# Patient Record
Sex: MALE | Race: WHITE | NOT HISPANIC OR LATINO
[De-identification: names, ages, dates, MRNs, and addresses within clinical notes are randomized per-mention and may not be internally consistent; named-entity substitution may affect disease eponyms.]

---

## 2022-09-28 ENCOUNTER — NON-APPOINTMENT (OUTPATIENT)
Age: 42
End: 2022-09-28

## 2022-09-29 ENCOUNTER — INPATIENT (INPATIENT)
Facility: HOSPITAL | Age: 42
LOS: 2 days | Discharge: HOME | DRG: 392 | End: 2022-10-02
Payer: COMMERCIAL

## 2022-09-29 VITALS
RESPIRATION RATE: 18 BRPM | TEMPERATURE: 99 F | OXYGEN SATURATION: 97 % | HEART RATE: 77 BPM | HEIGHT: 70 IN | DIASTOLIC BLOOD PRESSURE: 79 MMHG | SYSTOLIC BLOOD PRESSURE: 117 MMHG | WEIGHT: 156.09 LBS

## 2022-09-29 LAB
ALBUMIN SERPL ELPH-MCNC: 4.6 G/DL — SIGNIFICANT CHANGE UP (ref 3.3–5)
ALP SERPL-CCNC: 75 U/L — SIGNIFICANT CHANGE UP (ref 40–120)
ALT FLD-CCNC: 11 U/L — SIGNIFICANT CHANGE UP (ref 10–45)
ANION GAP SERPL CALC-SCNC: 10 MMOL/L — SIGNIFICANT CHANGE UP (ref 5–17)
AST SERPL-CCNC: 17 U/L — SIGNIFICANT CHANGE UP (ref 10–40)
BASOPHILS # BLD AUTO: 0.02 K/UL — SIGNIFICANT CHANGE UP (ref 0–0.2)
BASOPHILS NFR BLD AUTO: 0.2 % — SIGNIFICANT CHANGE UP (ref 0–2)
BILIRUB SERPL-MCNC: 2.5 MG/DL — HIGH (ref 0.2–1.2)
BUN SERPL-MCNC: 12 MG/DL — SIGNIFICANT CHANGE UP (ref 7–23)
CALCIUM SERPL-MCNC: 9.7 MG/DL — SIGNIFICANT CHANGE UP (ref 8.4–10.5)
CHLORIDE SERPL-SCNC: 101 MMOL/L — SIGNIFICANT CHANGE UP (ref 96–108)
CO2 SERPL-SCNC: 26 MMOL/L — SIGNIFICANT CHANGE UP (ref 22–31)
CREAT SERPL-MCNC: 0.94 MG/DL — SIGNIFICANT CHANGE UP (ref 0.5–1.3)
EGFR: 104 ML/MIN/1.73M2 — SIGNIFICANT CHANGE UP
EOSINOPHIL # BLD AUTO: 0.02 K/UL — SIGNIFICANT CHANGE UP (ref 0–0.5)
EOSINOPHIL NFR BLD AUTO: 0.2 % — SIGNIFICANT CHANGE UP (ref 0–6)
GLUCOSE SERPL-MCNC: 100 MG/DL — HIGH (ref 70–99)
HCT VFR BLD CALC: 43.8 % — SIGNIFICANT CHANGE UP (ref 39–50)
HGB BLD-MCNC: 15.4 G/DL — SIGNIFICANT CHANGE UP (ref 13–17)
IMM GRANULOCYTES NFR BLD AUTO: 0.2 % — SIGNIFICANT CHANGE UP (ref 0–0.9)
LIDOCAIN IGE QN: 19 U/L — SIGNIFICANT CHANGE UP (ref 7–60)
LYMPHOCYTES # BLD AUTO: 0.83 K/UL — LOW (ref 1–3.3)
LYMPHOCYTES # BLD AUTO: 8.5 % — LOW (ref 13–44)
MAGNESIUM SERPL-MCNC: 1.8 MG/DL — SIGNIFICANT CHANGE UP (ref 1.6–2.6)
MCHC RBC-ENTMCNC: 29.9 PG — SIGNIFICANT CHANGE UP (ref 27–34)
MCHC RBC-ENTMCNC: 35.2 GM/DL — SIGNIFICANT CHANGE UP (ref 32–36)
MCV RBC AUTO: 85 FL — SIGNIFICANT CHANGE UP (ref 80–100)
MONOCYTES # BLD AUTO: 0.62 K/UL — SIGNIFICANT CHANGE UP (ref 0–0.9)
MONOCYTES NFR BLD AUTO: 6.3 % — SIGNIFICANT CHANGE UP (ref 2–14)
NEUTROPHILS # BLD AUTO: 8.31 K/UL — HIGH (ref 1.8–7.4)
NEUTROPHILS NFR BLD AUTO: 84.6 % — HIGH (ref 43–77)
NRBC # BLD: 0 /100 WBCS — SIGNIFICANT CHANGE UP (ref 0–0)
PLATELET # BLD AUTO: 147 K/UL — LOW (ref 150–400)
POTASSIUM SERPL-MCNC: 4.1 MMOL/L — SIGNIFICANT CHANGE UP (ref 3.5–5.3)
POTASSIUM SERPL-SCNC: 4.1 MMOL/L — SIGNIFICANT CHANGE UP (ref 3.5–5.3)
PROCALCITONIN SERPL-MCNC: 0.16 NG/ML — HIGH (ref 0.02–0.1)
PROT SERPL-MCNC: 7.4 G/DL — SIGNIFICANT CHANGE UP (ref 6–8.3)
RBC # BLD: 5.15 M/UL — SIGNIFICANT CHANGE UP (ref 4.2–5.8)
RBC # FLD: 12.2 % — SIGNIFICANT CHANGE UP (ref 10.3–14.5)
SODIUM SERPL-SCNC: 137 MMOL/L — SIGNIFICANT CHANGE UP (ref 135–145)
WBC # BLD: 9.82 K/UL — SIGNIFICANT CHANGE UP (ref 3.8–10.5)
WBC # FLD AUTO: 9.82 K/UL — SIGNIFICANT CHANGE UP (ref 3.8–10.5)

## 2022-09-29 PROCEDURE — 74177 CT ABD & PELVIS W/CONTRAST: CPT | Mod: 26,MA

## 2022-09-29 PROCEDURE — 99285 EMERGENCY DEPT VISIT HI MDM: CPT

## 2022-09-29 RX ORDER — ONDANSETRON 8 MG/1
4 TABLET, FILM COATED ORAL ONCE
Refills: 0 | Status: COMPLETED | OUTPATIENT
Start: 2022-09-29 | End: 2022-09-29

## 2022-09-29 RX ORDER — AMPICILLIN SODIUM AND SULBACTAM SODIUM 250; 125 MG/ML; MG/ML
3 INJECTION, POWDER, FOR SUSPENSION INTRAMUSCULAR; INTRAVENOUS ONCE
Refills: 0 | Status: COMPLETED | OUTPATIENT
Start: 2022-09-29 | End: 2022-09-29

## 2022-09-29 RX ORDER — SODIUM CHLORIDE 9 MG/ML
1000 INJECTION, SOLUTION INTRAVENOUS ONCE
Refills: 0 | Status: COMPLETED | OUTPATIENT
Start: 2022-09-29 | End: 2022-09-29

## 2022-09-29 RX ORDER — ACETAMINOPHEN 500 MG
1000 TABLET ORAL ONCE
Refills: 0 | Status: COMPLETED | OUTPATIENT
Start: 2022-09-29 | End: 2022-09-29

## 2022-09-29 RX ORDER — FAMOTIDINE 10 MG/ML
20 INJECTION INTRAVENOUS ONCE
Refills: 0 | Status: COMPLETED | OUTPATIENT
Start: 2022-09-29 | End: 2022-09-29

## 2022-09-29 RX ORDER — SODIUM CHLORIDE 9 MG/ML
1000 INJECTION, SOLUTION INTRAVENOUS
Refills: 0 | Status: DISCONTINUED | OUTPATIENT
Start: 2022-09-29 | End: 2022-09-30

## 2022-09-29 RX ADMIN — FAMOTIDINE 20 MILLIGRAM(S): 10 INJECTION INTRAVENOUS at 22:10

## 2022-09-29 RX ADMIN — Medication 400 MILLIGRAM(S): at 22:11

## 2022-09-29 RX ADMIN — SODIUM CHLORIDE 4000 MILLILITER(S): 9 INJECTION, SOLUTION INTRAVENOUS at 22:11

## 2022-09-29 RX ADMIN — ONDANSETRON 4 MILLIGRAM(S): 8 TABLET, FILM COATED ORAL at 22:11

## 2022-09-29 NOTE — ED PROVIDER NOTE - PHYSICAL EXAMINATION
gen: Non toxic appearing, looks uncomfortable  Mentation: AAO x 3  psych: mood appropriate  HEENT: airway patent, conjunctivae clear bilaterally  Cardio: RRR, no m/r/g  Resp: normal BS b/l  GI: soft/nondistended, LLQ ttp  Neuro: sensation and motor function grossly intact  Skin: No evidence of rash  MSK: normal movement of all extremities  Lymph/Vasc: no LE edema

## 2022-09-29 NOTE — ED PROVIDER NOTE - ATTENDING CONTRIBUTION TO CARE
------------ATTENDING NOTE------------  pt c/o 24 hrs of constant gradually worsening moderate crampy pressure in LLQ, worse w/ bending/twisting, decreased PO, no fevers, no urinary complaints, no change w/ BM/stools, exam w/ LLQ tenderness/rebound, labs/imaging c/w acute diverticulitis, improving w/ IVF/pain medications, awaiting surgical consult and likely admission for IVF, advance diet, antibiotics, pain control.  - Mann Denney MD   ----------------------------------------------

## 2022-09-29 NOTE — ED ADULT NURSE NOTE - OBJECTIVE STATEMENT
41yo M no pertinent pmh presents to ED ambulatory from home with c/o constant burning LLQ abdominal pain since 3am, reports pain is worse with sudden movement. Pt reports was seen at  today and had negative urinalysis, advised to ED for further eval. Pt endorsing associated nausea and chills. Denies fevers, cp, sob, vomiting, diarrhea. In ED, pt is awake, a&ox4, appears uncomfortable, afebrile, vitals stable, abd soft nd ttp to LLQ. pt seen and eval by MD. Labs sent. Pt medicated. Pt sent to CT myles. pending results.

## 2022-09-29 NOTE — ED PROVIDER NOTE - RAPID ASSESSMENT
42 M with no pertinent PMHx presents to the Ed c/o L abd pain x today. Pt woke up this morning with abd discomfort and went back to sleep with no improvement in pain. Also endorses decreased PO intake. Went to UC and had a UA that was normal. Took GasX and Miralax w/o improvement.  Denies dysuria, hematuria, frequency, vomiting. Pt is well appearing.    **Pt seen in the waiting room via teletriage by Natalie Funez), documentation completed by Joni Sams. Pt to be sent to main ED for further evaluation - all orders placed to be followed by MD in the main ED** 42 M with no pertinent PMHx presents to the Ed c/o LLQ abd pain x today. Pt woke up this morning with abd discomfort and went back to sleep with no improvement in pain. Also endorses decreased PO intake. Went to UC and had a UA that was normal. Took GasX and Miralax w/o improvement.  Denies dysuria, hematuria, frequency, vomiting. denies chest pain, SOB. Pt is well appearing.    **Pt seen in the waiting room via teletriage by Natalie Funez (PA), documentation completed by Joni Sams. Pt to be sent to main ED for further evaluation - all orders placed to be followed by MD in the main ED**    INatalie PA-C, personally performed the service described in the documentation recorded by the scribe in my presence, and it accurately and completely records my words and actions. Pt to receive full H&P in Main ER.

## 2022-09-29 NOTE — ED PROVIDER NOTE - OBJECTIVE STATEMENT
42 year old male with no pertinent PMHx presenting with abdominal pain. Patient woke up at 3am with LLQ abdominal pain. Went back to sleep, woke up at 6am with worsening abdominal pain. Pain is non-radiating, constant, worsened by eat. Took gasx with no relief. Went to urgent care with negative UA and sent to ED. Patient denies fevers, chest pain, sob, nausea, vomiting.

## 2022-09-30 DIAGNOSIS — K57.92 DIVERTICULITIS OF INTESTINE, PART UNSPECIFIED, WITHOUT PERFORATION OR ABSCESS WITHOUT BLEEDING: ICD-10-CM

## 2022-09-30 LAB
ALBUMIN SERPL ELPH-MCNC: 4.5 G/DL — SIGNIFICANT CHANGE UP (ref 3.3–5)
ALP SERPL-CCNC: 71 U/L — SIGNIFICANT CHANGE UP (ref 40–120)
ALT FLD-CCNC: 10 U/L — SIGNIFICANT CHANGE UP (ref 10–45)
ANION GAP SERPL CALC-SCNC: 11 MMOL/L — SIGNIFICANT CHANGE UP (ref 5–17)
AST SERPL-CCNC: 18 U/L — SIGNIFICANT CHANGE UP (ref 10–40)
BILIRUB DIRECT SERPL-MCNC: 0.3 MG/DL — SIGNIFICANT CHANGE UP (ref 0–0.3)
BILIRUB INDIRECT FLD-MCNC: 3.5 MG/DL — HIGH (ref 0.2–1)
BILIRUB SERPL-MCNC: 3.8 MG/DL — HIGH (ref 0.2–1.2)
BUN SERPL-MCNC: 10 MG/DL — SIGNIFICANT CHANGE UP (ref 7–23)
CALCIUM SERPL-MCNC: 9.3 MG/DL — SIGNIFICANT CHANGE UP (ref 8.4–10.5)
CHLORIDE SERPL-SCNC: 102 MMOL/L — SIGNIFICANT CHANGE UP (ref 96–108)
CO2 SERPL-SCNC: 25 MMOL/L — SIGNIFICANT CHANGE UP (ref 22–31)
CREAT SERPL-MCNC: 0.93 MG/DL — SIGNIFICANT CHANGE UP (ref 0.5–1.3)
EGFR: 106 ML/MIN/1.73M2 — SIGNIFICANT CHANGE UP
GLUCOSE SERPL-MCNC: 96 MG/DL — SIGNIFICANT CHANGE UP (ref 70–99)
HCT VFR BLD CALC: 43.9 % — SIGNIFICANT CHANGE UP (ref 39–50)
HGB BLD-MCNC: 16 G/DL — SIGNIFICANT CHANGE UP (ref 13–17)
MAGNESIUM SERPL-MCNC: 1.9 MG/DL — SIGNIFICANT CHANGE UP (ref 1.6–2.6)
MCHC RBC-ENTMCNC: 30.6 PG — SIGNIFICANT CHANGE UP (ref 27–34)
MCHC RBC-ENTMCNC: 36.4 GM/DL — HIGH (ref 32–36)
MCV RBC AUTO: 83.9 FL — SIGNIFICANT CHANGE UP (ref 80–100)
NRBC # BLD: 0 /100 WBCS — SIGNIFICANT CHANGE UP (ref 0–0)
PHOSPHATE SERPL-MCNC: 2.6 MG/DL — SIGNIFICANT CHANGE UP (ref 2.5–4.5)
PLATELET # BLD AUTO: 153 K/UL — SIGNIFICANT CHANGE UP (ref 150–400)
POTASSIUM SERPL-MCNC: 4.1 MMOL/L — SIGNIFICANT CHANGE UP (ref 3.5–5.3)
POTASSIUM SERPL-SCNC: 4.1 MMOL/L — SIGNIFICANT CHANGE UP (ref 3.5–5.3)
PROT SERPL-MCNC: 6.8 G/DL — SIGNIFICANT CHANGE UP (ref 6–8.3)
RBC # BLD: 5.23 M/UL — SIGNIFICANT CHANGE UP (ref 4.2–5.8)
RBC # FLD: 12.4 % — SIGNIFICANT CHANGE UP (ref 10.3–14.5)
SARS-COV-2 RNA SPEC QL NAA+PROBE: SIGNIFICANT CHANGE UP
SODIUM SERPL-SCNC: 138 MMOL/L — SIGNIFICANT CHANGE UP (ref 135–145)
WBC # BLD: 10.06 K/UL — SIGNIFICANT CHANGE UP (ref 3.8–10.5)
WBC # FLD AUTO: 10.06 K/UL — SIGNIFICANT CHANGE UP (ref 3.8–10.5)

## 2022-09-30 RX ORDER — SODIUM CHLORIDE 9 MG/ML
1000 INJECTION, SOLUTION INTRAVENOUS
Refills: 0 | Status: DISCONTINUED | OUTPATIENT
Start: 2022-09-30 | End: 2022-10-02

## 2022-09-30 RX ORDER — ENOXAPARIN SODIUM 100 MG/ML
40 INJECTION SUBCUTANEOUS EVERY 24 HOURS
Refills: 0 | Status: DISCONTINUED | OUTPATIENT
Start: 2022-09-30 | End: 2022-10-02

## 2022-09-30 RX ORDER — PIPERACILLIN AND TAZOBACTAM 4; .5 G/20ML; G/20ML
3.38 INJECTION, POWDER, LYOPHILIZED, FOR SOLUTION INTRAVENOUS ONCE
Refills: 0 | Status: DISCONTINUED | OUTPATIENT
Start: 2022-09-30 | End: 2022-09-30

## 2022-09-30 RX ORDER — MAGNESIUM SULFATE 500 MG/ML
2 VIAL (ML) INJECTION ONCE
Refills: 0 | Status: COMPLETED | OUTPATIENT
Start: 2022-09-30 | End: 2022-09-30

## 2022-09-30 RX ORDER — KETOROLAC TROMETHAMINE 30 MG/ML
15 SYRINGE (ML) INJECTION ONCE
Refills: 0 | Status: DISCONTINUED | OUTPATIENT
Start: 2022-09-30 | End: 2022-09-30

## 2022-09-30 RX ORDER — PIPERACILLIN AND TAZOBACTAM 4; .5 G/20ML; G/20ML
3.38 INJECTION, POWDER, LYOPHILIZED, FOR SOLUTION INTRAVENOUS ONCE
Refills: 0 | Status: COMPLETED | OUTPATIENT
Start: 2022-09-30 | End: 2022-09-30

## 2022-09-30 RX ORDER — INFLUENZA VIRUS VACCINE 15; 15; 15; 15 UG/.5ML; UG/.5ML; UG/.5ML; UG/.5ML
0.5 SUSPENSION INTRAMUSCULAR ONCE
Refills: 0 | Status: DISCONTINUED | OUTPATIENT
Start: 2022-09-30 | End: 2022-10-02

## 2022-09-30 RX ORDER — KETOROLAC TROMETHAMINE 30 MG/ML
15 SYRINGE (ML) INJECTION ONCE
Refills: 0 | Status: DISCONTINUED | OUTPATIENT
Start: 2022-09-30 | End: 2022-10-01

## 2022-09-30 RX ORDER — HYDROMORPHONE HYDROCHLORIDE 2 MG/ML
1 INJECTION INTRAMUSCULAR; INTRAVENOUS; SUBCUTANEOUS ONCE
Refills: 0 | Status: DISCONTINUED | OUTPATIENT
Start: 2022-09-30 | End: 2022-09-30

## 2022-09-30 RX ORDER — PIPERACILLIN AND TAZOBACTAM 4; .5 G/20ML; G/20ML
3.38 INJECTION, POWDER, LYOPHILIZED, FOR SOLUTION INTRAVENOUS EVERY 8 HOURS
Refills: 0 | Status: DISCONTINUED | OUTPATIENT
Start: 2022-09-30 | End: 2022-10-02

## 2022-09-30 RX ADMIN — PIPERACILLIN AND TAZOBACTAM 25 GRAM(S): 4; .5 INJECTION, POWDER, LYOPHILIZED, FOR SOLUTION INTRAVENOUS at 05:03

## 2022-09-30 RX ADMIN — AMPICILLIN SODIUM AND SULBACTAM SODIUM 200 GRAM(S): 250; 125 INJECTION, POWDER, FOR SUSPENSION INTRAMUSCULAR; INTRAVENOUS at 00:34

## 2022-09-30 RX ADMIN — SODIUM CHLORIDE 125 MILLILITER(S): 9 INJECTION, SOLUTION INTRAVENOUS at 02:13

## 2022-09-30 RX ADMIN — HYDROMORPHONE HYDROCHLORIDE 1 MILLIGRAM(S): 2 INJECTION INTRAMUSCULAR; INTRAVENOUS; SUBCUTANEOUS at 10:28

## 2022-09-30 RX ADMIN — PIPERACILLIN AND TAZOBACTAM 25 GRAM(S): 4; .5 INJECTION, POWDER, LYOPHILIZED, FOR SOLUTION INTRAVENOUS at 14:31

## 2022-09-30 RX ADMIN — Medication 25 GRAM(S): at 09:48

## 2022-09-30 RX ADMIN — ENOXAPARIN SODIUM 40 MILLIGRAM(S): 100 INJECTION SUBCUTANEOUS at 14:31

## 2022-09-30 RX ADMIN — PIPERACILLIN AND TAZOBACTAM 25 GRAM(S): 4; .5 INJECTION, POWDER, LYOPHILIZED, FOR SOLUTION INTRAVENOUS at 21:15

## 2022-09-30 RX ADMIN — HYDROMORPHONE HYDROCHLORIDE 1 MILLIGRAM(S): 2 INJECTION INTRAMUSCULAR; INTRAVENOUS; SUBCUTANEOUS at 08:01

## 2022-09-30 RX ADMIN — PIPERACILLIN AND TAZOBACTAM 200 GRAM(S): 4; .5 INJECTION, POWDER, LYOPHILIZED, FOR SOLUTION INTRAVENOUS at 02:17

## 2022-09-30 RX ADMIN — Medication 15 MILLIGRAM(S): at 17:18

## 2022-09-30 RX ADMIN — SODIUM CHLORIDE 125 MILLILITER(S): 9 INJECTION, SOLUTION INTRAVENOUS at 21:15

## 2022-09-30 RX ADMIN — Medication 15 MILLIGRAM(S): at 16:48

## 2022-09-30 NOTE — PROGRESS NOTE ADULT - ASSESSMENT
43yo man with PMH spinal cord injury (no residual motor deficits) presenting to [facility] ED for 21 hours of acute-onset LLQ pain, found to have acute diverticulitis with microperforation on abdominal CT. First episode with no h/o diverticulitis.     - Start Zosyn IV  - NPO (sips and chips) /IVF   - advance to [facility] if improving   - Pain control PRN  - AM labs

## 2022-09-30 NOTE — ED ADULT NURSE REASSESSMENT NOTE - NS ED NURSE REASSESS COMMENT FT1
Patient complaining of 7/10 pain, requesting medication. MD Lancaster contacted via Teams and made aware.

## 2022-09-30 NOTE — H&P ADULT - ATTENDING COMMENTS
Patient with first episode of diverticulitis.  Small area of extraluminal gas on CT.  Localized tenderness with guarding in the left lower quadrant.  IV fluids.  IV antibiotics.  Sips and chips okay.  Advance to clears if less pain later today.

## 2022-09-30 NOTE — H&P ADULT - NSHPPHYSICALEXAM_GEN_ALL_CORE
Constitutional - NAD, Comfortable  Neuro: A+Ox3  HEENT - NCAT, EOMI  Chest - good chest expansion, no respiratory distress  Cardio - warm and well perfused, RRR  Abdomen -  Soft, non-distended, ttp LLQ  Extremities - No peripheral edema  Psychiatric - Mood stable, Affect WNL  Skin: No rashes or lesions noted

## 2022-09-30 NOTE — H&P ADULT - HISTORY OF PRESENT ILLNESS
41yo man with PMH spinal cord injury (no residual motor deficits) presenting to [facility] ED for 21 hours of acute-onset LLQ pain. He states that he went to bed last night in his normal state of health but woke up at 3am on 9/29 with LLQ pain. He urinated then went back to sleep and woke up at 6am experiencing worsening abdominal pain that he describes as non-radiating, constant, and rated 7-8/10 in severity. It is worsened by eating and is accompanied by intermittent nausea but no vomiting. He took no pain medications at home, but took GasX with no relief. He then went to urgent care where he had a negative UA and was sent to the ED. He reports no f/c, sob, vomiting or change in bowel habits. He reports he had a normal, nonbloody stool this morning with no discomfort. His last colonoscopy was in 2014 and showed multiple polyps. Patient is a 43yo man with PMH spinal cord injury (no residual motor deficits) presenting to [facility] ED for 21 hours of acute-onset LLQ pain. He states that he went to bed last night in his normal state of health but woke up at 3am on 9/29 with LLQ pain. He urinated then went back to sleep and woke up at 6am experiencing worsening abdominal pain that he describes as non-radiating, constant, and rated 7-8/10 in severity. It is worsened by eating and is accompanied by intermittent nausea but no vomiting. He took no pain medications at home, but took GasX with no relief. He then went to urgent care where he had a negative UA and was sent to the ED. He reports no f/c, sob, vomiting or change in bowel habits. He reports he had a normal, nonbloody stool this morning with no discomfort. His last colonoscopy was in 2014 and showed multiple polyps.    Surgery consulted to evaluate diverticulitis. Patient seen and examined in ED. Patient is hemodynamically stable and comfortable in bed. Reports pain in LLQ. Abdomen is soft, tender in LLQ, no guarding or bounding. CT demonstrated an acute diverticulitis with microperforation. Labs are WNL.

## 2022-09-30 NOTE — H&P ADULT - NSICDXPASTMEDICALHX_GEN_ALL_CORE_FT
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

## 2022-09-30 NOTE — H&P ADULT - ASSESSMENT
41yo man with PMH spinal cord injury (no residual motor deficits) presenting to [facility] ED for 21 hours of acute-onset LLQ pain, found to have acute diverticulitis with microperforation on abdominal CT. First episode with no h/o diverticulitis    Plan:  -Admit to surgical service   -Start Zosyn IV  -NPO/IVF  -Pain control with Tylenol, nausea control with Zofran PRN 43yo man with PMH spinal cord injury (no residual motor deficits) presenting to [facility] ED for 21 hours of acute-onset LLQ pain, found to have acute diverticulitis with microperforation on abdominal CT. First episode with no h/o diverticulitis    Plan:    - Admit to surgical service under Dr. Rabago  - Start Zosyn IV  - NPO/IVF  - Pain control with Tylenol, nausea control with Zofran PRN  - AM labs    Patient seen and examined. Plan discussed with senior resident, Dr. Jae Lancaster MD, PGY2  [phone]

## 2022-09-30 NOTE — H&P ADULT - NSHPREVIEWOFSYSTEMS_GEN_ALL_CORE
REVIEW OF SYSTEMS  Constitutional: No fever, No Chills, No fatigue  HEENT: No throat pain   Pulm: No cough, No shortness of breath  Cardio: No chest pain  GI:  +abdominal pain, +nausea, No vomiting, No diarrhea, No constipation, Last Bowel Movement on 9/30/22  : No dysuria, No frequency, No hematuria  Neuro: No headaches, No loss of strength, baseline decreased sensation b/l legs from spinal cord injury  Skin: No itching, No rashes, No lesions   MSK: No joint pain, No joint swelling, No muscle pain  Psych: No depression, No anxiety REVIEW OF SYSTEMS  Constitutional: No fever, No Chills, No fatigue  HEENT: No throat pain   Pulm: No cough, No shortness of breath  Cardio: No chest pain  GI:  +tender in LLQ, +nausea, No vomiting, No diarrhea, No constipation, Last Bowel Movement on 9/30/22  : No dysuria, No frequency, No hematuria  Neuro: No headaches, No loss of strength, baseline decreased sensation b/l legs from spinal cord injury  Skin: No itching, No rashes, No lesions   MSK: No joint pain, No joint swelling, No muscle pain  Psych: No depression, No anxiety

## 2022-09-30 NOTE — PATIENT PROFILE ADULT - FALL HARM RISK - UNIVERSAL INTERVENTIONS
Bed in lowest position, wheels locked, appropriate side rails in place/Call bell, personal items and telephone in reach/Instruct patient to call for assistance before getting out of bed or chair/Non-slip footwear when patient is out of bed/[place] to call system/Physically safe environment - no spills, clutter or unnecessary equipment/Purposeful Proactive Rounding/Room/bathroom lighting operational, light cord in reach

## 2022-09-30 NOTE — H&P ADULT - NSHPLABSRESULTS_GEN_ALL_CORE
Labs               15.4   9.82  )-----------( 147      ( 29 Sep 2022 21:51 )             43.8     137  |  101  |  12  ----------------------------<  100<H>  4.1   |  26  |  0.94    Ca    9.7      29 Sep 2022 21:51  Mg     1.8     09-29    TPro  7.4  /  Alb  4.6  /  TBili  2.5<H>  /  DBili  x   /  AST  17  /  ALT  11  /  AlkPhos  75       Imaging    < from: CT Abdomen and Pelvis w/ IV Cont (09.29.22 @ 22:12) >  ACC: [id number] EXAM:  CT ABDOMEN AND PELVIS IC                        PROCEDURE DATE:  09/29/2022    INTERPRETATION:  CLINICAL INFORMATION: Left lower quadrant abdominal pain.    COMPARISON: None.    CONTRAST/COMPLICATIONS:  IV Contrast: Omnipaque 350  90 cc administered   0 cc discarded  Oral Contrast: NONE  Complications: None reported at time of study completion    PROCEDURE:  CT of the Abdomen and Pelvis was performed.  Sagittal and coronal reformats were performed.    FINDINGS:  LOWER CHEST: Within normal limits.    LIVER: Within normal limits.  BILE DUCTS: Normal caliber.  GALLBLADDER: Within normal limits.  SPLEEN: Within normal limits.  PANCREAS: Within normal limits.  ADRENALS: Within normal limits.  KIDNEYS/URETERS: Within normal limits.    BLADDER: Within normal limits.  REPRODUCTIVE ORGANS: Prostate is normal in size.    BOWEL: Inflamed diverticulum of the proximal sigmoid colon with local   short segment inflammatory changes of the colonic wall and surrounding   mesenteric edema and fat stranding (2:79). Questionable area of contained   microperforation is present in this region. No pericolonic collection.   There are a few dilated loops of small bowel adjacent to this site.   Appendix is normal.  PERITONEUM: Small volume pelvic ascites.  VESSELS: Within normal limits.  RETROPERITONEUM/LYMPH NODES: No lymphadenopathy.  ABDOMINAL WALL: Small fat-containing left inguinal hernia.  BONES: Within normal limits.    IMPRESSION:  Acute sigmoid diverticulitis with tiny area of adjacent contained   microperforation. No definitive abscess at this time.    Few dilated loops of small bowel adjacent to this site likely represent a   reactive ileus.    --- End of Report ---     CLINTON VIRGEN MD; Resident Radiologist  This document has been electronically signed.   KAROLINE BEJARANO MD; Attending Radiologist  This document has been electronically signed. Sep 29 2022 11:42PM    < end of copied text >

## 2022-09-30 NOTE — ED ADULT NURSE REASSESSMENT NOTE - NS ED NURSE REASSESS COMMENT FT1
Patient admitted to Surgery. Admission band applied to patient utilizing two patient identifiers. Patient notified and updated on plan of care. pending bed

## 2022-10-01 RX ORDER — KETOROLAC TROMETHAMINE 30 MG/ML
15 SYRINGE (ML) INJECTION ONCE
Refills: 0 | Status: DISCONTINUED | OUTPATIENT
Start: 2022-10-01 | End: 2022-10-01

## 2022-10-01 RX ORDER — ACETAMINOPHEN 500 MG
650 TABLET ORAL EVERY 6 HOURS
Refills: 0 | Status: DISCONTINUED | OUTPATIENT
Start: 2022-10-01 | End: 2022-10-02

## 2022-10-01 RX ADMIN — Medication 15 MILLIGRAM(S): at 22:01

## 2022-10-01 RX ADMIN — ENOXAPARIN SODIUM 40 MILLIGRAM(S): 100 INJECTION SUBCUTANEOUS at 17:00

## 2022-10-01 RX ADMIN — Medication 15 MILLIGRAM(S): at 00:33

## 2022-10-01 RX ADMIN — PIPERACILLIN AND TAZOBACTAM 25 GRAM(S): 4; .5 INJECTION, POWDER, LYOPHILIZED, FOR SOLUTION INTRAVENOUS at 05:34

## 2022-10-01 RX ADMIN — SODIUM CHLORIDE 125 MILLILITER(S): 9 INJECTION, SOLUTION INTRAVENOUS at 05:34

## 2022-10-01 RX ADMIN — PIPERACILLIN AND TAZOBACTAM 25 GRAM(S): 4; .5 INJECTION, POWDER, LYOPHILIZED, FOR SOLUTION INTRAVENOUS at 21:17

## 2022-10-01 RX ADMIN — Medication 15 MILLIGRAM(S): at 00:03

## 2022-10-01 RX ADMIN — Medication 15 MILLIGRAM(S): at 13:15

## 2022-10-01 RX ADMIN — Medication 15 MILLIGRAM(S): at 21:31

## 2022-10-01 RX ADMIN — PIPERACILLIN AND TAZOBACTAM 25 GRAM(S): 4; .5 INJECTION, POWDER, LYOPHILIZED, FOR SOLUTION INTRAVENOUS at 13:15

## 2022-10-01 NOTE — PROGRESS NOTE ADULT - ASSESSMENT
43yo man with PMH spinal cord injury (no residual motor deficits) presenting to [facility] ED for 21 hours of acute-onset LLQ pain, found to have acute diverticulitis with microperforation on abdominal CT. First episode with no h/o diverticulitis.     - Start Zosyn IV  - NPO (sips and chips) /IVF   - advance to [facility]D if improving   - Pain control PRN  - AM labs      [facility]   [id number] 41yo man with PMH spinal cord injury (no residual motor deficits) presenting to [facility] ED for 21 hours of acute-onset LLQ pain, found to have acute diverticulitis with microperforation on abdominal CT. First episode with no h/o diverticulitis.     - IV Abx  - po clears, IVF, abbie urine noted at bedside   - Pain control PRN    [facility]   [id number]

## 2022-10-01 NOTE — PROGRESS NOTE ADULT - ATTENDING COMMENTS
42yM with diverticulitis with microperf  Abd pain mildly improved  No nausea  Tolerating clears    Afeb VSS  Abd soft, TTP LLQ  no rebound    Plan  Cont IV abx  cont clears PO    Martin Ruiz MD

## 2022-10-02 ENCOUNTER — TRANSCRIPTION ENCOUNTER (OUTPATIENT)
Age: 42
End: 2022-10-02

## 2022-10-02 VITALS
DIASTOLIC BLOOD PRESSURE: 60 MMHG | TEMPERATURE: 98 F | RESPIRATION RATE: 18 BRPM | OXYGEN SATURATION: 98 % | SYSTOLIC BLOOD PRESSURE: 108 MMHG | HEART RATE: 56 BPM

## 2022-10-02 LAB
ALBUMIN SERPL ELPH-MCNC: 3.4 G/DL — SIGNIFICANT CHANGE UP (ref 3.3–5)
ALP SERPL-CCNC: 53 U/L — SIGNIFICANT CHANGE UP (ref 40–120)
ALT FLD-CCNC: 7 U/L — LOW (ref 10–45)
ANION GAP SERPL CALC-SCNC: 8 MMOL/L — SIGNIFICANT CHANGE UP (ref 5–17)
AST SERPL-CCNC: 10 U/L — SIGNIFICANT CHANGE UP (ref 10–40)
BILIRUB SERPL-MCNC: 2 MG/DL — HIGH (ref 0.2–1.2)
BUN SERPL-MCNC: 8 MG/DL — SIGNIFICANT CHANGE UP (ref 7–23)
CALCIUM SERPL-MCNC: 8.9 MG/DL — SIGNIFICANT CHANGE UP (ref 8.4–10.5)
CHLORIDE SERPL-SCNC: 106 MMOL/L — SIGNIFICANT CHANGE UP (ref 96–108)
CO2 SERPL-SCNC: 26 MMOL/L — SIGNIFICANT CHANGE UP (ref 22–31)
CREAT SERPL-MCNC: 0.96 MG/DL — SIGNIFICANT CHANGE UP (ref 0.5–1.3)
EGFR: 102 ML/MIN/1.73M2 — SIGNIFICANT CHANGE UP
GLUCOSE SERPL-MCNC: 76 MG/DL — SIGNIFICANT CHANGE UP (ref 70–99)
HCT VFR BLD CALC: 36.7 % — LOW (ref 39–50)
HGB BLD-MCNC: 12.5 G/DL — LOW (ref 13–17)
MAGNESIUM SERPL-MCNC: 1.9 MG/DL — SIGNIFICANT CHANGE UP (ref 1.6–2.6)
MCHC RBC-ENTMCNC: 30.2 PG — SIGNIFICANT CHANGE UP (ref 27–34)
MCHC RBC-ENTMCNC: 34.1 GM/DL — SIGNIFICANT CHANGE UP (ref 32–36)
MCV RBC AUTO: 88.6 FL — SIGNIFICANT CHANGE UP (ref 80–100)
NRBC # BLD: 0 /100 WBCS — SIGNIFICANT CHANGE UP (ref 0–0)
PHOSPHATE SERPL-MCNC: 3.1 MG/DL — SIGNIFICANT CHANGE UP (ref 2.5–4.5)
PLATELET # BLD AUTO: 118 K/UL — LOW (ref 150–400)
POTASSIUM SERPL-MCNC: 3.9 MMOL/L — SIGNIFICANT CHANGE UP (ref 3.5–5.3)
POTASSIUM SERPL-SCNC: 3.9 MMOL/L — SIGNIFICANT CHANGE UP (ref 3.5–5.3)
PROT SERPL-MCNC: 5.9 G/DL — LOW (ref 6–8.3)
RBC # BLD: 4.14 M/UL — LOW (ref 4.2–5.8)
RBC # FLD: 12.3 % — SIGNIFICANT CHANGE UP (ref 10.3–14.5)
SODIUM SERPL-SCNC: 140 MMOL/L — SIGNIFICANT CHANGE UP (ref 135–145)
WBC # BLD: 4.88 K/UL — SIGNIFICANT CHANGE UP (ref 3.8–10.5)
WBC # FLD AUTO: 4.88 K/UL — SIGNIFICANT CHANGE UP (ref 3.8–10.5)

## 2022-10-02 PROCEDURE — U0003: CPT

## 2022-10-02 PROCEDURE — 80053 COMPREHEN METABOLIC PANEL: CPT

## 2022-10-02 PROCEDURE — 99285 EMERGENCY DEPT VISIT HI MDM: CPT

## 2022-10-02 PROCEDURE — 84145 PROCALCITONIN (PCT): CPT

## 2022-10-02 PROCEDURE — 82248 BILIRUBIN DIRECT: CPT

## 2022-10-02 PROCEDURE — 85025 COMPLETE CBC W/AUTO DIFF WBC: CPT

## 2022-10-02 PROCEDURE — 83690 ASSAY OF LIPASE: CPT

## 2022-10-02 PROCEDURE — U0005: CPT

## 2022-10-02 PROCEDURE — 36415 COLL VENOUS BLD VENIPUNCTURE: CPT

## 2022-10-02 PROCEDURE — 84100 ASSAY OF PHOSPHORUS: CPT

## 2022-10-02 PROCEDURE — 74177 CT ABD & PELVIS W/CONTRAST: CPT | Mod: MA

## 2022-10-02 PROCEDURE — 83735 ASSAY OF MAGNESIUM: CPT

## 2022-10-02 PROCEDURE — 85027 COMPLETE CBC AUTOMATED: CPT

## 2022-10-02 RX ORDER — IBUPROFEN 200 MG
1 TABLET ORAL
Qty: 0 | Refills: 0 | DISCHARGE
Start: 2022-10-02

## 2022-10-02 RX ORDER — IBUPROFEN 200 MG
400 TABLET ORAL EVERY 6 HOURS
Refills: 0 | Status: DISCONTINUED | OUTPATIENT
Start: 2022-10-02 | End: 2022-10-02

## 2022-10-02 RX ORDER — ACETAMINOPHEN 500 MG
2 TABLET ORAL
Qty: 0 | Refills: 0 | DISCHARGE
Start: 2022-10-02

## 2022-10-02 RX ORDER — MAGNESIUM SULFATE 500 MG/ML
1 VIAL (ML) INJECTION ONCE
Refills: 0 | Status: COMPLETED | OUTPATIENT
Start: 2022-10-02 | End: 2022-10-02

## 2022-10-02 RX ADMIN — PIPERACILLIN AND TAZOBACTAM 25 GRAM(S): 4; .5 INJECTION, POWDER, LYOPHILIZED, FOR SOLUTION INTRAVENOUS at 05:28

## 2022-10-02 RX ADMIN — Medication 100 GRAM(S): at 12:03

## 2022-10-02 RX ADMIN — ENOXAPARIN SODIUM 40 MILLIGRAM(S): 100 INJECTION SUBCUTANEOUS at 13:54

## 2022-10-02 RX ADMIN — PIPERACILLIN AND TAZOBACTAM 25 GRAM(S): 4; .5 INJECTION, POWDER, LYOPHILIZED, FOR SOLUTION INTRAVENOUS at 13:53

## 2022-10-02 NOTE — PROGRESS NOTE ADULT - SUBJECTIVE AND OBJECTIVE BOX
Overnight events:   - abdominal exam at midnight: patient was soft, tender to palpation in LLQ, nondistended    SUBJECTIVE:  Patient was seen and examined on AM rounds.    OBJECTIVE:  Vital Signs Last 24 Hrs  T(C): 37 (01 Oct 2022 01:19), Max: 37.1 (30 Sep 2022 07:50)  T(F): 98.6 (01 Oct 2022 01:19), Max: 98.7 (30 Sep 2022 07:50)  HR: 61 (01 Oct 2022 01:19) (50 - 70)  BP: 104/59 (01 Oct 2022 01:19) (98/66 - 116/74)  BP(mean): 85 (30 Sep 2022 04:30) (85 - 85)  RR: 18 (01 Oct 2022 01:19) (18 - 18)  SpO2: 97% (01 Oct 2022 01:19) (96% - 100%)    Parameters below as of 01 Oct 2022 01:19  Patient On (Oxygen Delivery Method): room air          09-30-22 @ 07:01  -  10-01-22 @ 01:51  --------------------------------------------------------  IN: 240 mL / OUT: 0 mL / NET: 240 mL        Physical Examination:  General Appearance: Appears well, NAD  Respiratory: No labored breathing  CV: Pulse regularly present  Abdomen: Soft, Tender on LLQ and focally peritoneal, Nondistended.         LABS:                        16.0   10.06 )-----------( 153      ( 30 Sep 2022 09:12 )             43.9       09-30    138  |  102  |  10  ----------------------------<  96  4.1   |  25  |  0.93    Ca    9.3      30 Sep 2022 09:12  Phos  2.6     09-30  Mg     1.9     09-30    TPro  6.8  /  Alb  4.5  /  TBili  3.8<H>  /  DBili  0.3  /  AST  18  /  ALT  10  /  AlkPhos  71  09-30      
Surgery Progress Note    SUBJECTIVE: Pt seen and examined at bedside. Patient is in pain. No nausea, vomiting, diarrhea. -Gas/-BM.     Vital Signs Last 24 Hrs  T(C): 36.9 (30 Sep 2022 06:18), Max: 37.2 (29 Sep 2022 19:22)  T(F): 98.4 (30 Sep 2022 06:18), Max: 99 (29 Sep 2022 19:22)  HR: 53 (30 Sep 2022 06:18) (50 - 77)  BP: 102/63 (30 Sep 2022 06:18) (102/63 - 120/87)  BP(mean): 85 (30 Sep 2022 04:30) (83 - 95)  RR: 18 (30 Sep 2022 06:18) (17 - 18)  SpO2: 96% (30 Sep 2022 06:18) (96% - 100%)    Parameters below as of 30 Sep 2022 06:18  Patient On (Oxygen Delivery Method): room air        Physical Exam:  General Appearance: Appears well, NAD  Respiratory: No labored breathing  CV: Pulse regularly present  Abdomen: Soft, Tender on LLQ and focally peritoneal, Nondistended.     LABS:                        15.4   9.82  )-----------( 147      ( 29 Sep 2022 21:51 )             43.8     09-29    137  |  101  |  12  ----------------------------<  100<H>  4.1   |  26  |  0.94    Ca    9.7      29 Sep 2022 21:51  Mg     1.8     09-29    TPro  7.4  /  Alb  4.6  /  TBili  2.5<H>  /  DBili  x   /  AST  17  /  ALT  11  /  AlkPhos  75  09-29          INs and OUTs:  
SURGERY DAILY PROGRESS NOTE:     SUBJECTIVE/ROS: Patient seen at bedside this AM.    24h Events:   - Overnight, no acute events    OBJECTIVE:  Vital Signs Last 24 Hrs  T(C): 36.6 (02 Oct 2022 05:25), Max: 36.9 (01 Oct 2022 12:58)  T(F): 97.9 (02 Oct 2022 05:25), Max: 98.4 (01 Oct 2022 12:58)  HR: 67 (02 Oct 2022 05:25) (45 - 67)  BP: 105/68 (02 Oct 2022 05:25) (105/68 - 120/68)  BP(mean): --  RR: 18 (02 Oct 2022 05:25) (18 - 18)  SpO2: 97% (02 Oct 2022 05:25) (96% - 99%)    Parameters below as of 02 Oct 2022 05:25  Patient On (Oxygen Delivery Method): room air      I&O's Detail    30 Sep 2022 07:01  -  01 Oct 2022 07:00  --------------------------------------------------------  IN:    IV PiggyBack: 200 mL    Lactated Ringers: 1500 mL    Oral Fluid: 240 mL  Total IN: 1940 mL    OUT:    Blood Loss (mL): 0 mL  Total OUT: 0 mL    Total NET: 1940 mL      01 Oct 2022 07:01  -  02 Oct 2022 06:57  --------------------------------------------------------  IN:    Oral Fluid: 200 mL  Total IN: 200 mL    OUT:    Voided (mL): 1750 mL  Total OUT: 1750 mL    Total NET: -1550 mL        Daily     Daily   MEDICATIONS  (STANDING):  enoxaparin Injectable 40 milliGRAM(s) SubCutaneous every 24 hours  influenza   Vaccine 0.5 milliLiter(s) IntraMuscular once  lactated ringers. 1000 milliLiter(s) (125 mL/Hr) IV Continuous <Continuous>  piperacillin/tazobactam IVPB.. 3.375 Gram(s) IV Intermittent every 8 hours    MEDICATIONS  (PRN):  acetaminophen     Tablet .. 650 milliGRAM(s) Oral every 6 hours PRN Mild Pain (1 - 3)      LABS:                        16.0   10.06 )-----------( 153      ( 30 Sep 2022 09:12 )             43.9     09-30    138  |  102  |  10  ----------------------------<  96  4.1   |  25  |  0.93    Ca    9.3      30 Sep 2022 09:12  Phos  2.6     09-30  Mg     1.9     09-30    TPro  6.8  /  Alb  4.5  /  TBili  3.8<H>  /  DBili  0.3  /  AST  18  /  ALT  10  /  AlkPhos  71  09-30    PHYSICAL EXAM:  Gen: AAOx3, non-toxic  Head: NCAT  HEENT: EOMI, oral mucosa moist, normal conjunctiva  Lung: Breathing on RA, unlabored   Abd: soft, non-distended, no guarding. LLQ+ tenderness   MSK: no visible deformities  Neuro: No focal sensory or motor deficits

## 2022-10-02 NOTE — DIETITIAN INITIAL EVALUATION ADULT - ORAL INTAKE PTA/DIET HISTORY
plain greek yogurt for breakfast, salad or turkey sandwich for lunch, leftovers off children's plates at dinner including chicken nuggets and fries. coffee in between meals. Orgain 1 x daily.

## 2022-10-02 NOTE — DIETITIAN INITIAL EVALUATION ADULT - NS FNS DIET ORDER
Problem: Patient Centered Care  Goal: Patient preferences are identified and integrated in the patient's plan of care  Description  Interventions:  - What would you like us to know as we care for you?  I speak Maltese  - Provide timely, complete, and accu response  - Implement non-pharmacological measures as appropriate and evaluate response  - Consider cultural and social influences on pain and pain management  - Manage/alleviate anxiety  - Utilize distraction and/or relaxation techniques  - Monitor for op and appropriate  Outcome: Adequate for Discharge  Goal: Free from bleeding injury  Description  (Example usage: patient with low platelets)  INTERVENTIONS:  - Avoid intramuscular injections, enemas and rectal medication administration  - Ensure safe mobili Diet, Regular:   Fiber/Residue Restricted (LOWFIBER) (10-02-22 @ 10:26)   be patient, do not interrupt  - Minimize distractions  - Allow time for understanding and response  - Establish method for patient to ask for assistance (call light)  - Provide an  as needed  - Communicate barriers and strategies to overcome wit

## 2022-10-02 NOTE — DIETITIAN INITIAL EVALUATION ADULT - ORAL NUTRITION SUPPLEMENTS
Ten x 2 daily  Promote x 1 daily Ten x 2 daily-if stage 2 present  Promote x 1 daily-discussed with green surgery

## 2022-10-02 NOTE — DISCHARGE NOTE PROVIDER - NSDCMRMEDTOKEN_GEN_ALL_CORE_FT
acetaminophen 325 mg oral tablet: 2 tab(s) orally every 6 hours, As needed, Mild Pain (1 - 3)  amoxicillin-clavulanate 875 mg-125 mg oral tablet: 1 tab(s) orally every 12 hours   ibuprofen 400 mg oral tablet: 1 tab(s) orally every 6 hours, As needed, Moderate Pain (4 - 6)

## 2022-10-02 NOTE — DIETITIAN INITIAL EVALUATION ADULT - PERTINENT LABORATORY DATA
10-02    140  |  106  |  8   ----------------------------<  76  3.9   |  26  |  0.96    Ca    8.9      02 Oct 2022 07:17  Phos  3.1     10-02  Mg     1.9     10-02    TPro  5.9<L>  /  Alb  3.4  /  TBili  2.0<H>  /  DBili  x   /  AST  10  /  ALT  7<L>  /  AlkPhos  53  10-02

## 2022-10-02 NOTE — DISCHARGE NOTE NURSING/CASE MANAGEMENT/SOCIAL WORK - NSDCPEFALRISK_GEN_ALL_CORE
For information on Fall & Injury Prevention, visit: [email or web address] OR  [email or web address] OR  https://www.cdc.gov/steadi/patient.html

## 2022-10-02 NOTE — DIETITIAN INITIAL EVALUATION ADULT - PERTINENT MEDS FT
MEDICATIONS  (STANDING):  enoxaparin Injectable 40 milliGRAM(s) SubCutaneous every 24 hours  influenza   Vaccine 0.5 milliLiter(s) IntraMuscular once  magnesium sulfate  IVPB 1 Gram(s) IV Intermittent once  piperacillin/tazobactam IVPB.. 3.375 Gram(s) IV Intermittent every 8 hours    MEDICATIONS  (PRN):  acetaminophen     Tablet .. 650 milliGRAM(s) Oral every 6 hours PRN Mild Pain (1 - 3)  ibuprofen  Tablet. 400 milliGRAM(s) Oral every 6 hours PRN Moderate Pain (4 - 6)

## 2022-10-02 NOTE — PROGRESS NOTE ADULT - ASSESSMENT
41yo man with PMH spinal cord injury (no residual motor deficits) presenting to [facility] ED for 21 hours of acute-onset LLQ pain, found to have acute diverticulitis with microperforation on abdominal CT. First episode with no h/o diverticulitis.     - IV Abx  - po clears, IVF, abbie urine noted at bedside   - Pain control PRN  - Focally peritoneally in LLQ, serial abd exams    [facility]   [id number]

## 2022-10-02 NOTE — DISCHARGE NOTE NURSING/CASE MANAGEMENT/SOCIAL WORK - PATIENT PORTAL LINK FT
You can access the FollowMyHealth Patient Portal offered by [facility] by registering at the following website: [email or web address]. By joining [employer]’s FollowMyHealth portal, you will also be able to view your health information using other applications (apps) compatible with our system.
[facility]

## 2022-10-02 NOTE — PROGRESS NOTE ADULT - ATTENDING COMMENTS
42M with sigmoid diverticulitis  Pain improved  + flatus    Abd soft, minimally tender LLQ    Plan  IV abx  advance diet    Martin Ruiz MD

## 2022-10-02 NOTE — DISCHARGE NOTE PROVIDER - NSDCCPCAREPLAN_GEN_ALL_CORE_FT
PRINCIPAL DISCHARGE DIAGNOSIS  Diagnosis: Acute diverticulitis  Assessment and Plan of Treatment:       SECONDARY DISCHARGE DIAGNOSES  Diagnosis: Mild dehydration  Assessment and Plan of Treatment:

## 2022-10-02 NOTE — DISCHARGE NOTE PROVIDER - CARE PROVIDER_API CALL
Luis Eduardo Rabago)  ColonRectal Surgery; Surgery  [address], [address]  [place] [address]  Phone: [phone]  Fax: [phone]  Follow Up Time: 2 weeks

## 2022-10-02 NOTE — DIETITIAN INITIAL EVALUATION ADULT - NSFNSPHYEXAMSKINFT_GEN_A_CORE
Pressure Injury 1: Right:,second toe, Stage II  Pressure Injury 2: none, none  Pressure Injury 3: none, none  Pressure Injury 4: none, none  Pressure Injury 5: none, none  Pressure Injury 6: none, none  Pressure Injury 7: none, none  Pressure Injury 8: none, none  Pressure Injury 9: none, none  Pressure Injury 10: none, none  Pressure Injury 11: none, none, Pressure Injury 1: Right:,second toe, none  Pressure Injury 2: none, none  Pressure Injury 3: none, none  Pressure Injury 4: none, none  Pressure Injury 5: none, none  Pressure Injury 6: none, none  Pressure Injury 7: none, none  Pressure Injury 8: none, none  Pressure Injury 9: none, none  Pressure Injury 10: none, none  Pressure Injury 11: none, none Pressure Injury 1: Right:,second toe, Stage II-questionable as per green surgery  Pressure Injury 2: none, none  Pressure Injury 3: none, none  Pressure Injury 4: none, none  Pressure Injury 5: none, none  Pressure Injury 6: none, none  Pressure Injury 7: none, none  Pressure Injury 8: none, none  Pressure Injury 9: none, none  Pressure Injury 10: none, none  Pressure Injury 11: none, none, Pressure Injury 1: Right:,second toe, none  Pressure Injury 2: none, none  Pressure Injury 3: none, none  Pressure Injury 4: none, none  Pressure Injury 5: none, none  Pressure Injury 6: none, none  Pressure Injury 7: none, none  Pressure Injury 8: none, none  Pressure Injury 9: none, none  Pressure Injury 10: none, none  Pressure Injury 11: none, none

## 2022-10-05 PROBLEM — Z00.00 ENCOUNTER FOR PREVENTIVE HEALTH EXAMINATION: Noted: 2022-10-05

## 2022-10-06 DIAGNOSIS — R19.7 DIARRHEA, UNSPECIFIED: ICD-10-CM

## 2022-10-18 NOTE — H&P ADULT - BIRTH SEX
Procedure(s):  LEFT ANKLE ARTHROSCOPY WITH DEBRIDEMENT AND LATERAL LIGAMENT BROSTROM ANTUNEZ RECONSTRUCTION (ANES GENERAL WITH POPLITEAL AND ADDUCTOR BLOCK). general, regional    Anesthesia Post Evaluation      Multimodal analgesia: multimodal analgesia used between 6 hours prior to anesthesia start to PACU discharge  Patient location during evaluation: PACU  Patient participation: complete - patient participated  Level of consciousness: awake and alert  Pain score: 0  Airway patency: patent  Anesthetic complications: no  Cardiovascular status: acceptable  Respiratory status: acceptable  Hydration status: acceptable  Comments: Pt has Popliteal & Adductor Canal blocks. Non-weight bearing postop. Post anesthesia nausea and vomiting:  none  Final Post Anesthesia Temperature Assessment:  Normothermia (36.0-37.5 degrees C)      INITIAL Post-op Vital signs:   Vitals Value Taken Time   /86 10/18/22 1100   Temp 36.2 °C (97.2 °F) 10/18/22 1046   Pulse 102 10/18/22 1101   Resp 19 10/18/22 1101   SpO2 97 % 10/18/22 1101   Vitals shown include unvalidated device data.
Male

## 2022-10-19 ENCOUNTER — APPOINTMENT (OUTPATIENT)
Dept: SURGERY | Facility: CLINIC | Age: 42
End: 2022-10-19

## 2022-10-19 VITALS
DIASTOLIC BLOOD PRESSURE: 70 MMHG | SYSTOLIC BLOOD PRESSURE: 122 MMHG | HEIGHT: 70 IN | WEIGHT: 155 LBS | TEMPERATURE: 97.3 F | OXYGEN SATURATION: 97 % | BODY MASS INDEX: 22.19 KG/M2 | HEART RATE: 57 BPM | RESPIRATION RATE: 16 BRPM

## 2022-10-19 DIAGNOSIS — K57.32 DIVERTICULITIS OF LARGE INTESTINE W/OUT PERFORATION OR ABSCESS W/OUT BLEEDING: ICD-10-CM

## 2022-10-19 DIAGNOSIS — Z80.0 FAMILY HISTORY OF MALIGNANT NEOPLASM OF DIGESTIVE ORGANS: ICD-10-CM

## 2022-10-19 PROCEDURE — 99214 OFFICE O/P EST MOD 30 MIN: CPT

## 2022-10-19 RX ORDER — AMOXICILLIN AND CLAVULANATE POTASSIUM 875; 125 MG/1; MG/1
875-125 TABLET, COATED ORAL
Qty: 28 | Refills: 0 | Status: DISCONTINUED | COMMUNITY
Start: 2022-10-02

## 2022-10-19 NOTE — HISTORY OF PRESENT ILLNESS
[FreeTextEntry1] : Patient is coming in for consultation. \par \par Patient was hospitalized from 09/30/22 - 10/2/22 for abdominal pain\par \par CT A/P 9/29/22 - Acute sigmoid diverticulitis with tiny area of adjacent contained microperforation.  No abscess.  Few dilated loops of small bowel adjacent to this site likely represent a reactive ileus. \par \par Today pt reports no pain.  This is his first known diverticulitis episode.  First episode started on 9/29/22. Hospitalized for this episode was given IV antibiotics and sent home on oral antibiotics, oral meds finished.  Patient move his bowel every other day from diarrhea to constipation BMs, not taking any laxatives.  Denies straining no bleeding, , and denies feeling prolapsed tissue. Pt reported L lower quadrant abdominal pain when he had the episode.  Denies nausea and vomiting but was having nausea during episode. Denies fever and chills, chills during the episode. Good appetite but lost of appetite during episode. Not taking any anticoagulants.\par

## 2022-10-19 NOTE — ASSESSMENT
[FreeTextEntry1] : I have seen and evaluated patient and I have corroborated all nursing input into this note.  Patient's status post first episode of uncomplicated diverticulitis.  Currently he feels well.  He will return to a high-fiber diet.  Colonoscopy recommended.  Indications, risks, benefits, alternatives reviewed including but not limited to bleeding, perforation, and incomplete exam.  All questions answered.

## 2022-10-19 NOTE — PHYSICAL EXAM
[Abdomen Masses] : No abdominal masses [Abdomen Tenderness] : ~T No ~M abdominal tenderness [JVD] : no jugular venous distention  [Normal Breath Sounds] : Normal breath sounds [Normal Heart Sounds] : normal heart sounds [Alert] : alert [Oriented to Person] : oriented to person [Oriented to Place] : oriented to place [Oriented to Time] : oriented to time [Calm] : calm [de-identified] : no distress

## 2022-10-20 DIAGNOSIS — Z12.11 ENCOUNTER FOR SCREENING FOR MALIGNANT NEOPLASM OF COLON: ICD-10-CM

## 2022-10-20 RX ORDER — SODIUM PICOSULFATE, MAGNESIUM OXIDE, AND ANHYDROUS CITRIC ACID 10; 3.5; 12 MG/160ML; G/160ML; G/160ML
10-3.5-12 MG-GM LIQUID ORAL
Qty: 1 | Refills: 0 | Status: ACTIVE | COMMUNITY
Start: 2022-10-20 | End: 1900-01-01

## 2022-11-04 LAB — SARS-COV-2 N GENE NPH QL NAA+PROBE: NOT DETECTED

## 2022-11-07 ENCOUNTER — TRANSCRIPTION ENCOUNTER (OUTPATIENT)
Age: 42
End: 2022-11-07

## 2022-11-07 ENCOUNTER — OUTPATIENT (OUTPATIENT)
Dept: OUTPATIENT SERVICES | Facility: HOSPITAL | Age: 42
LOS: 1 days | Discharge: HOME | End: 2022-11-07
Payer: COMMERCIAL

## 2022-11-07 ENCOUNTER — APPOINTMENT (OUTPATIENT)
Dept: SURGERY | Facility: HOSPITAL | Age: 42
End: 2022-11-07

## 2022-11-07 VITALS
OXYGEN SATURATION: 99 % | RESPIRATION RATE: 18 BRPM | HEART RATE: 54 BPM | DIASTOLIC BLOOD PRESSURE: 66 MMHG | SYSTOLIC BLOOD PRESSURE: 107 MMHG

## 2022-11-07 VITALS
DIASTOLIC BLOOD PRESSURE: 79 MMHG | TEMPERATURE: 98 F | HEIGHT: 70 IN | RESPIRATION RATE: 15 BRPM | OXYGEN SATURATION: 97 % | SYSTOLIC BLOOD PRESSURE: 128 MMHG | WEIGHT: 154.98 LBS | HEART RATE: 55 BPM

## 2022-11-07 DIAGNOSIS — Z98.890 OTHER SPECIFIED POSTPROCEDURAL STATES: Chronic | ICD-10-CM

## 2022-11-07 DIAGNOSIS — K57.32 DIVERTICULITIS OF LARGE INTESTINE WITHOUT PERFORATION OR ABSCESS WITHOUT BLEEDING: ICD-10-CM

## 2022-11-07 PROCEDURE — 45378 DIAGNOSTIC COLONOSCOPY: CPT

## 2022-11-07 RX ORDER — SODIUM CHLORIDE 9 MG/ML
500 INJECTION INTRAMUSCULAR; INTRAVENOUS; SUBCUTANEOUS
Refills: 0 | Status: COMPLETED | OUTPATIENT
Start: 2022-11-07 | End: 2022-11-07

## 2022-11-07 RX ADMIN — SODIUM CHLORIDE 75 MILLILITER(S): 9 INJECTION INTRAMUSCULAR; INTRAVENOUS; SUBCUTANEOUS at 13:26

## 2022-11-07 NOTE — PRE PROCEDURE NOTE - PRE PROCEDURE EVALUATION
Attending Physician:                            Dr. 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 in b/l legs      History of orthopedic surgery        ALLERGIES:  No Known Drug Allergies  Pineapple (Blisters)    HOME MEDICATIONS:  acetaminophen 325 mg oral tablet: 2 tab(s) orally every 6 hours, As needed, Mild Pain (1 - 3)  ibuprofen 400 mg oral tablet: 1 tab(s) orally every 6 hours, As needed, Moderate Pain (4 - 6)    AICD/PPM: [ ] yes   [x] no    PERTINENT LAB DATA:                      PHYSICAL EXAMINATION:    Height (cm): 177.8  Weight (kg): 70.3  BMI (kg/m2): 22.2  BSA (m2): 1.87T(C): --  HR: --  BP: --  RR: --  SpO2: --    Constitutional: NAD  Respiratory: breathing  comfortably  Cardiovascular: regular rate  Gastrointestinal: soft, NT/ND  Extremities: No peripheral edema  Neurological: A/O x 3, no focal deficits  Psychiatric: Normal mood, normal affect  Skin: No rashes    ASA Class: I [x]  II [ ]  III [ ]  IV [ ]    COMMENTS:    The patient is a suitable candidate for the planned procedure unless box checked [ ]  No, explain:

## 2022-11-07 NOTE — ASU DISCHARGE PLAN (ADULT/PEDIATRIC) - CARE PROVIDER_API CALL
Luis Eduardo Rabago)  ColonRectal Surgery; Surgery  [address], [address]  [place] [address]  Phone: [phone]  Fax: [phone]  Established Patient  Follow Up Time:

## 2022-11-07 NOTE — PRE-ANESTHESIA EVALUATION ADULT - NSANTHOSAYNRD_GEN_A_CORE
No. MISTY screening performed.  STOP BANG Legend: 0-2 = LOW Risk; 3-4 = INTERMEDIATE Risk; 5-8 = HIGH Risk

## 2022-11-07 NOTE — ASU PATIENT PROFILE, ADULT - FALL HARM RISK - UNIVERSAL INTERVENTIONS
Bed in lowest position, wheels locked, appropriate side rails in place/Call bell, personal items and telephone in reach/Instruct patient to call for assistance before getting out of bed or chair/Non-slip footwear when patient is out of bed/[place] to call system/Physically safe environment - no spills, clutter or unnecessary equipment/Purposeful Proactive Rounding/Room/bathroom lighting operational, light cord in reach

## 2022-11-07 NOTE — ASU PATIENT PROFILE, ADULT - HAVE YOU HAD COVID IN THE LAST 60 DAYS?
Initial SW/CM Assessment/Plan of Care Note     Baseline Assessment  63 year old admitted 7/1/2021 as Observation. Prior to admission patient was receiving subacute rehab at [facility]. Patient’s Primary Care Provider is Edin Harvey MD.     Medical History  Past Medical History:   Diagnosis Date   • Acute ischemic stroke (CMS/[facility])    • AF (atrial fibrillation) (CMS/[facility])    • COVID-19 02/2021   • Glaucoma 03/2020   • High cholesterol    • Hypertension    • Hypertensive retinopathy    • Hypertensive retinopathy of both eyes    • Insulin dependent type 2 diabetes mellitus (CMS/[facility])    • Ketoacidosis due to diabetes (CMS/[facility])    • Neovascular glaucoma of right eye    • NSTEMI (non-ST elevated myocardial infarction) (CMS/[facility])    • Ocular hypertension of left eye    • PDR (proliferative diabetic retinopathy) (CMS/[facility])    • Pseudophakia of both eyes    • S/P coronary artery stent placement 06/21/2021    Mid RCA   • Type 2 diabetes mellitus with left eye affected by severe nonproliferative retinopathy and macular edema, without long-term current use of insulin (CMS/[facility])    • Type 2 diabetes mellitus with right eye affected by proliferative retinopathy without macular edema, without long-term current use of insulin (CMS/[facility])    • Wound, open, scapula        Agency/Support  Type of Services Prior to Hospitalization: Other (comment) (was receiving subacute rehab at [facility])  Support Systems: Spouse/Significant other, Family members    Current Status  Current Mental Status: Cooperative    Insurance  Primary: [facility]  Secondary: N/A    Barriers to Discharge  Identified Barriers to Discharge/Transition Planning: Assessment/stabilization in progress, Insurance authorization    Progress Note  Return to [facility] when medically clear. May need transportation.    Plan  SW/CM - Recommendations for Discharge: SNF     Anticipate patient can return to the environment from which patient entered the hospital.    Anticipate patient cannot provide self-care at discharge.    Refer to /CM Flowsheet for Goals and objective data.      No

## 2022-11-07 NOTE — ASU DISCHARGE PLAN (ADULT/PEDIATRIC) - NS MD DC FALL RISK RISK
For information on Fall & Injury Prevention, visit: [email or web address] OR  [email or web address] OR  https://www.cdc.gov/steadi/patient.html

## 2022-11-07 NOTE — ASU DISCHARGE PLAN (ADULT/PEDIATRIC) - ASU DC SPECIAL INSTRUCTIONSFT
Follow up for repeat colonoscopy in 10 years. Follow up sooner if you have another episode of diverticulitis.

## 2023-03-05 ENCOUNTER — TRANSCRIPTION ENCOUNTER (OUTPATIENT)
Age: 43
End: 2023-03-05

## 2023-03-05 ENCOUNTER — EMERGENCY (EMERGENCY)
Facility: HOSPITAL | Age: 43
LOS: 1 days | Discharge: HOME | End: 2023-03-05
Attending: EMERGENCY MEDICINE
Payer: COMMERCIAL

## 2023-03-05 VITALS
SYSTOLIC BLOOD PRESSURE: 110 MMHG | DIASTOLIC BLOOD PRESSURE: 57 MMHG | RESPIRATION RATE: 18 BRPM | OXYGEN SATURATION: 98 % | TEMPERATURE: 98 F | HEART RATE: 62 BPM

## 2023-03-05 VITALS
HEART RATE: 70 BPM | RESPIRATION RATE: 17 BRPM | OXYGEN SATURATION: 99 % | SYSTOLIC BLOOD PRESSURE: 110 MMHG | TEMPERATURE: 99 F | DIASTOLIC BLOOD PRESSURE: 69 MMHG

## 2023-03-05 DIAGNOSIS — Z98.890 OTHER SPECIFIED POSTPROCEDURAL STATES: Chronic | ICD-10-CM

## 2023-03-05 LAB
ALBUMIN SERPL ELPH-MCNC: 4.7 G/DL — SIGNIFICANT CHANGE UP (ref 3.3–5)
ALP SERPL-CCNC: 85 U/L — SIGNIFICANT CHANGE UP (ref 40–120)
ALT FLD-CCNC: 9 U/L — LOW (ref 10–45)
ANION GAP SERPL CALC-SCNC: 10 MMOL/L — SIGNIFICANT CHANGE UP (ref 5–17)
APTT BLD: 31.8 SEC — SIGNIFICANT CHANGE UP (ref 27.5–35.5)
AST SERPL-CCNC: 17 U/L — SIGNIFICANT CHANGE UP (ref 10–40)
BASOPHILS # BLD AUTO: 0.03 K/UL — SIGNIFICANT CHANGE UP (ref 0–0.2)
BASOPHILS NFR BLD AUTO: 0.5 % — SIGNIFICANT CHANGE UP (ref 0–2)
BILIRUB SERPL-MCNC: 1 MG/DL — SIGNIFICANT CHANGE UP (ref 0.2–1.2)
BLD GP AB SCN SERPL QL: NEGATIVE — SIGNIFICANT CHANGE UP
BUN SERPL-MCNC: 19 MG/DL — SIGNIFICANT CHANGE UP (ref 7–23)
CALCIUM SERPL-MCNC: 9.2 MG/DL — SIGNIFICANT CHANGE UP (ref 8.4–10.5)
CHLORIDE SERPL-SCNC: 106 MMOL/L — SIGNIFICANT CHANGE UP (ref 96–108)
CO2 SERPL-SCNC: 26 MMOL/L — SIGNIFICANT CHANGE UP (ref 22–31)
CREAT SERPL-MCNC: 0.95 MG/DL — SIGNIFICANT CHANGE UP (ref 0.5–1.3)
EGFR: 103 ML/MIN/1.73M2 — SIGNIFICANT CHANGE UP
EOSINOPHIL # BLD AUTO: 0.04 K/UL — SIGNIFICANT CHANGE UP (ref 0–0.5)
EOSINOPHIL NFR BLD AUTO: 0.7 % — SIGNIFICANT CHANGE UP (ref 0–6)
GLUCOSE SERPL-MCNC: 73 MG/DL — SIGNIFICANT CHANGE UP (ref 70–99)
HCT VFR BLD CALC: 44.8 % — SIGNIFICANT CHANGE UP (ref 39–50)
HGB BLD-MCNC: 15.7 G/DL — SIGNIFICANT CHANGE UP (ref 13–17)
IMM GRANULOCYTES NFR BLD AUTO: 0.2 % — SIGNIFICANT CHANGE UP (ref 0–0.9)
INR BLD: 1.13 RATIO — SIGNIFICANT CHANGE UP (ref 0.88–1.16)
LYMPHOCYTES # BLD AUTO: 1.46 K/UL — SIGNIFICANT CHANGE UP (ref 1–3.3)
LYMPHOCYTES # BLD AUTO: 24.1 % — SIGNIFICANT CHANGE UP (ref 13–44)
MCHC RBC-ENTMCNC: 30.4 PG — SIGNIFICANT CHANGE UP (ref 27–34)
MCHC RBC-ENTMCNC: 35 GM/DL — SIGNIFICANT CHANGE UP (ref 32–36)
MCV RBC AUTO: 86.7 FL — SIGNIFICANT CHANGE UP (ref 80–100)
MONOCYTES # BLD AUTO: 0.43 K/UL — SIGNIFICANT CHANGE UP (ref 0–0.9)
MONOCYTES NFR BLD AUTO: 7.1 % — SIGNIFICANT CHANGE UP (ref 2–14)
NEUTROPHILS # BLD AUTO: 4.09 K/UL — SIGNIFICANT CHANGE UP (ref 1.8–7.4)
NEUTROPHILS NFR BLD AUTO: 67.4 % — SIGNIFICANT CHANGE UP (ref 43–77)
NRBC # BLD: 0 /100 WBCS — SIGNIFICANT CHANGE UP (ref 0–0)
PLATELET # BLD AUTO: 163 K/UL — SIGNIFICANT CHANGE UP (ref 150–400)
POTASSIUM SERPL-MCNC: 3.8 MMOL/L — SIGNIFICANT CHANGE UP (ref 3.5–5.3)
POTASSIUM SERPL-SCNC: 3.8 MMOL/L — SIGNIFICANT CHANGE UP (ref 3.5–5.3)
PROT SERPL-MCNC: 7.2 G/DL — SIGNIFICANT CHANGE UP (ref 6–8.3)
PROTHROM AB SERPL-ACNC: 13.1 SEC — SIGNIFICANT CHANGE UP (ref 10.5–13.4)
RBC # BLD: 5.17 M/UL — SIGNIFICANT CHANGE UP (ref 4.2–5.8)
RBC # FLD: 12.3 % — SIGNIFICANT CHANGE UP (ref 10.3–14.5)
RH IG SCN BLD-IMP: POSITIVE — SIGNIFICANT CHANGE UP
SODIUM SERPL-SCNC: 142 MMOL/L — SIGNIFICANT CHANGE UP (ref 135–145)
WBC # BLD: 6.06 K/UL — SIGNIFICANT CHANGE UP (ref 3.8–10.5)
WBC # FLD AUTO: 6.06 K/UL — SIGNIFICANT CHANGE UP (ref 3.8–10.5)

## 2023-03-05 PROCEDURE — 85025 COMPLETE CBC W/AUTO DIFF WBC: CPT

## 2023-03-05 PROCEDURE — 86850 RBC ANTIBODY SCREEN: CPT

## 2023-03-05 PROCEDURE — 99284 EMERGENCY DEPT VISIT MOD MDM: CPT

## 2023-03-05 PROCEDURE — 99284 EMERGENCY DEPT VISIT MOD MDM: CPT | Mod: 25

## 2023-03-05 PROCEDURE — 85730 THROMBOPLASTIN TIME PARTIAL: CPT

## 2023-03-05 PROCEDURE — 85610 PROTHROMBIN TIME: CPT

## 2023-03-05 PROCEDURE — 80053 COMPREHEN METABOLIC PANEL: CPT

## 2023-03-05 PROCEDURE — 86901 BLOOD TYPING SEROLOGIC RH(D): CPT

## 2023-03-05 PROCEDURE — 74177 CT ABD & PELVIS W/CONTRAST: CPT | Mod: 26,MA

## 2023-03-05 PROCEDURE — 36415 COLL VENOUS BLD VENIPUNCTURE: CPT

## 2023-03-05 PROCEDURE — 86900 BLOOD TYPING SEROLOGIC ABO: CPT

## 2023-03-05 PROCEDURE — 74177 CT ABD & PELVIS W/CONTRAST: CPT | Mod: MA

## 2023-03-05 RX ORDER — SODIUM CHLORIDE 9 MG/ML
1000 INJECTION INTRAMUSCULAR; INTRAVENOUS; SUBCUTANEOUS ONCE
Refills: 0 | Status: COMPLETED | OUTPATIENT
Start: 2023-03-05 | End: 2023-03-05

## 2023-03-05 RX ADMIN — SODIUM CHLORIDE 1000 MILLILITER(S): 9 INJECTION INTRAMUSCULAR; INTRAVENOUS; SUBCUTANEOUS at 17:43

## 2023-03-05 NOTE — ED PROVIDER NOTE - NSFOLLOWUPINSTRUCTIONS_ED_ALL_ED_FT
Follow up with your doctor in 2-3 days.    Return for worsening abdominal pain, vomiting, fever or other emergent concerns.

## 2023-03-05 NOTE — ED ADULT NURSE NOTE - NS ED NURSE LEVEL OF CONSCIOUSNESS SPEECH
Anesthesia Pre Eval Note    Anesthesia ROS/Med Hx    Overall Review:  EKG was reviewed     Anesthetic Complication History:  Patient does not have a history of anesthetic complications      Pulmonary Review:  Patient does not have a pulmonary history      Neuro/Psych Review:    CVA: Has h/o BPH. hydronephrosis, s/p uretral stent.     Cardiovascular Review:  Patient does not have a cardiovascular history   Exercise tolerance: good (>4 METS)    GI/HEPATIC/RENAL Review:    Positive for renal disease    End/Other Review:  Patient does not have an endo/other history      Overall Review of Systems Comments:  Stress Echo 2017 normal EF and no ischemia. Has h/o BPH. Hydronephrosis and he is s/p ureteral stent. He also has h/o lymphoma on remission.   Additional Results:     ALLERGIES:  No Known Allergies       Lab Results       Component                Value               Date                       WBC                      4.8                 08/13/2020                 RBC                      3.84 (L)            08/13/2020                 HGB                      13.3                08/13/2020                 HCT                      38.8 (L)            08/13/2020                 MCHC                     34.3                08/13/2020                 SODIUM                   142                 08/13/2020                 POTASSIUM                4.6                 08/13/2020                 CHLORIDE                 107                 08/13/2020                 CO2                      28                  08/13/2020                 GLUCOSE                  106 (H)             08/13/2020                 BUN                      17                  08/13/2020                 CREATININE               1.51 (H)            08/13/2020                 GFRA                                         08/13/2020             eGFR 30-59 mL/min/1.73m2 = Moderate decrease in kidney function. Stage 3 CKD (chronic kidney disease) or moderate  kidney disease.       GFRA                     51                  08/13/2020                 GFRNA                    44                  08/13/2020                 GFRNA                                        08/13/2020             eGFR 30-59 mL/min/1.73m2 = Moderate decrease in kidney function. Stage 3 CKD (chronic kidney disease) or moderate kidney disease.       CALCIUM                  9.1                 08/13/2020                 PLT                      162                 08/13/2020                 PTT                      NOT APPLICABLE      08/19/2016                 PTT                      62 (H)              08/19/2016                 INR                      1.1                 08/17/2016             Past Medical History:  No date: CKD (chronic kidney disease), stage III (CMS/HCC)  No date: History of lymphoma  No date: Hydronephrosis  No date: Hydronephrosis    Past Surgical History:  No date: Cataract extraction, bilateral  No date: Ureteral stent placement       Prior to Admission medications :  Medication finasteride (PROSCAR) 5 MG tablet, Sig finasteride 5 mg tablet, Start Date , End Date , Taking? Yes, Authorizing Provider Outside Provider            Relevant Problems   No relevant active problems       Physical Exam     Airway   Mallampati: II  TM Distance: >3 FB  Neck ROM: Full  Neck: Non-tender and Able to place in sniff position  TMJ Mobility: Good    Cardiovascular  Cardiovascular exam normal  Cardio Rhythm: Regular  Cardio Rate: Normal    Head Assessment  Head assessment: Normocephalic and Atraumatic    General Assessment  General Assessment: Alert and oriented and No acute distress    Dental Exam  Dental exam normal    Pulmonary Exam  Pulmonary exam normal  Breath sounds clear to auscultation:  Yes    Abdominal Exam  Abdominal exam normal      Anesthesia Plan    ASA Status: 3    Anesthesia Type: General    Induction: Intravenous  Preferred Airway Type: ETT  Maintenance:  Inhalational      Risks Discussed: Nausea, Vomiting and Dental Injury    Post-op Pain Management: Per Surgeon      Checklist  Reviewed: NPO Status, Allergies, Medications, Problem list and Past Med History    Informed Consent  The proposed anesthetic plan, including its risks and benefits, have been discussed with the Patient  - along with the risks and benefits of alternatives.  Questions were encouraged and answered and the patient and/or representative understands and agrees to proceed.     Blood Products: Not Anticipated     Speaking Coherently

## 2023-03-05 NOTE — ED PROVIDER NOTE - OBJECTIVE STATEMENT
41 y/o male, hx of diverticulitis, presents to the ED with complaint of RLQ abdominal pain. states pain started three days ago. states pain is intermittent and at times sharp in nature. rates pain 6/10. also states had diarrhea today. denies f/n/v, CP, SOB, HA, dizziness, urinary symptoms, cough, flank pain.

## 2023-03-05 NOTE — ED ADULT NURSE NOTE - NSIMPLEMENTINTERV_GEN_ALL_ED
Implemented All Universal Safety Interventions:  [place] to call system. Call bell, personal items and telephone within reach. Instruct patient to call for assistance. Room bathroom lighting operational. Non-slip footwear when patient is off stretcher. Physically safe environment: no spills, clutter or unnecessary equipment. Stretcher in lowest position, wheels locked, appropriate side rails in place.

## 2023-03-05 NOTE — ED ADULT NURSE NOTE - OBJECTIVE STATEMENT
Pt is a 42y M AxO4  presenting to the ED with abdominal pain. PMH of diverticulitis. Pt states he has had LRQ pain x5 days and watery diarrhea that began this morning. Pt endorsing bloating, and distension of abd, and decreased appetite today. Abd soft, mildly distended, skin warm dry intact. Pt denying fever, chills, vomiting, dark stools, weakness. Pt is well appearing, speaking full sentences, resting comfortably in bed.

## 2023-03-05 NOTE — ED PROVIDER NOTE - CLINICAL SUMMARY MEDICAL DECISION MAKING FREE TEXT BOX
42-year-old male presents with right lower quadrant abdominal pain associated with nausea and decreased p.o. intake for 3 days.  Symptoms are very concerning for appendicitis.  We will get a CT scan of the abdomen and pelvis to evaluate for that or other intra-abdominal pathology.  Patient declines pain meds at this time.

## 2023-03-05 NOTE — ED PROVIDER NOTE - PATIENT PORTAL LINK FT
You can access the FollowMyHealth Patient Portal offered by [facility] by registering at the following website: [email or web address] By joining [employer]’s FollowMyHealth portal, you will also be able to view your health information using other applications (apps) compatible with our system.

## 2023-03-06 PROBLEM — Z87.828 PERSONAL HISTORY OF OTHER (HEALED) PHYSICAL INJURY AND TRAUMA: Chronic | Status: ACTIVE | Noted: 2022-09-30

## 2023-03-13 ENCOUNTER — APPOINTMENT (OUTPATIENT)
Dept: GASTROENTEROLOGY | Facility: CLINIC | Age: 43
End: 2023-03-13
Payer: COMMERCIAL

## 2023-03-13 VITALS
WEIGHT: 156 LBS | HEIGHT: 70 IN | DIASTOLIC BLOOD PRESSURE: 80 MMHG | TEMPERATURE: 97.3 F | HEART RATE: 51 BPM | SYSTOLIC BLOOD PRESSURE: 110 MMHG | BODY MASS INDEX: 22.33 KG/M2 | OXYGEN SATURATION: 98 %

## 2023-03-13 DIAGNOSIS — Z80.0 ENCOUNTER FOR SCREENING FOR MALIGNANT NEOPLASM OF COLON: ICD-10-CM

## 2023-03-13 DIAGNOSIS — Z12.11 ENCOUNTER FOR SCREENING FOR MALIGNANT NEOPLASM OF COLON: ICD-10-CM

## 2023-03-13 PROCEDURE — 99204 OFFICE O/P NEW MOD 45 MIN: CPT

## 2023-03-13 RX ORDER — DICYCLOMINE HYDROCHLORIDE 20 MG/1
20 TABLET ORAL
Qty: 90 | Refills: 3 | Status: ACTIVE | COMMUNITY
Start: 2023-03-13 | End: 1900-01-01

## 2023-03-13 NOTE — PHYSICAL EXAM
[Normal] : normal bowel sounds, non-tender, no masses, soft, no no hepato-splenomegaly [No CVA Tenderness] : no CVA  tenderness [No Spinal Tenderness] : no spinal tenderness

## 2023-03-21 ENCOUNTER — LABORATORY RESULT (OUTPATIENT)
Age: 43
End: 2023-03-21

## 2023-03-21 ENCOUNTER — NON-APPOINTMENT (OUTPATIENT)
Age: 43
End: 2023-03-21

## 2023-03-21 ENCOUNTER — APPOINTMENT (OUTPATIENT)
Dept: INTERNAL MEDICINE | Facility: CLINIC | Age: 43
End: 2023-03-21
Payer: COMMERCIAL

## 2023-03-21 VITALS
HEIGHT: 70 IN | OXYGEN SATURATION: 99 % | DIASTOLIC BLOOD PRESSURE: 80 MMHG | RESPIRATION RATE: 16 BRPM | BODY MASS INDEX: 22.19 KG/M2 | HEART RATE: 53 BPM | TEMPERATURE: 97.3 F | SYSTOLIC BLOOD PRESSURE: 120 MMHG | WEIGHT: 155 LBS

## 2023-03-21 DIAGNOSIS — R79.89 OTHER SPECIFIED ABNORMAL FINDINGS OF BLOOD CHEMISTRY: ICD-10-CM

## 2023-03-21 DIAGNOSIS — Z13.6 ENCOUNTER FOR SCREENING FOR CARDIOVASCULAR DISORDERS: ICD-10-CM

## 2023-03-21 DIAGNOSIS — E29.1 TESTICULAR HYPOFUNCTION: ICD-10-CM

## 2023-03-21 DIAGNOSIS — Z00.00 ENCOUNTER FOR GENERAL ADULT MEDICAL EXAMINATION W/OUT ABNORMAL FINDINGS: ICD-10-CM

## 2023-03-21 DIAGNOSIS — R10.31 RIGHT LOWER QUADRANT PAIN: ICD-10-CM

## 2023-03-21 DIAGNOSIS — K58.9 IRRITABLE BOWEL SYNDROME W/OUT DIARRHEA: ICD-10-CM

## 2023-03-21 PROCEDURE — 99214 OFFICE O/P EST MOD 30 MIN: CPT | Mod: 25

## 2023-03-21 PROCEDURE — 93000 ELECTROCARDIOGRAM COMPLETE: CPT

## 2023-03-21 PROCEDURE — 99396 PREV VISIT EST AGE 40-64: CPT | Mod: 25

## 2023-03-21 PROCEDURE — 36415 COLL VENOUS BLD VENIPUNCTURE: CPT

## 2023-03-24 LAB
25(OH)D3 SERPL-MCNC: 25.7 NG/ML
ALBUMIN SERPL ELPH-MCNC: 4.8 G/DL
ALP BLD-CCNC: 78 U/L
ALT SERPL-CCNC: 8 U/L
ANION GAP SERPL CALC-SCNC: 12 MMOL/L
APPEARANCE: CLEAR
AST SERPL-CCNC: 18 U/L
BARLEY IGE QN: <0.1 KUA/L
BASOPHILS # BLD AUTO: 0.02 K/UL
BASOPHILS NFR BLD AUTO: 0.4 %
BILIRUB SERPL-MCNC: 1.3 MG/DL
BILIRUBIN URINE: NEGATIVE
BLOOD URINE: NEGATIVE
BUN SERPL-MCNC: 13 MG/DL
CALCIUM SERPL-MCNC: 9.5 MG/DL
CELIACPAN: NORMAL
CHERRY IGE QN: <0.1 KUA/L
CHLORIDE SERPL-SCNC: 103 MMOL/L
CHOLEST SERPL-MCNC: 182 MG/DL
CO2 SERPL-SCNC: 25 MMOL/L
COLOR: COLORLESS
COW MILK IGE QN: <0.1 KUA/L
CRAB IGE QN: <0.1 KUA/L
CREAT SERPL-MCNC: 0.89 MG/DL
DEPRECATED BARLEY IGE RAST QL: 0
DEPRECATED CHERRY IGE RAST QL: 0
DEPRECATED COW MILK IGE RAST QL: 0
DEPRECATED CRAB IGE RAST QL: 0
DEPRECATED EGG WHITE IGE RAST QL: 0
DEPRECATED OAT IGE RAST QL: 0
DEPRECATED PEANUT IGE RAST QL: 0
DEPRECATED RYE IGE RAST QL: 0
DEPRECATED SOYBEAN IGE RAST QL: 0
DEPRECATED WHEAT IGE RAST QL: 0
EGFR: 110 ML/MIN/1.73M2
EGG WHITE IGE QN: <0.1 KUA/L
EOSINOPHIL # BLD AUTO: 0.03 K/UL
EOSINOPHIL NFR BLD AUTO: 0.6 %
ESTIMATED AVERAGE GLUCOSE: 88 MG/DL
GLUCOSE QUALITATIVE U: NEGATIVE
GLUCOSE SERPL-MCNC: 83 MG/DL
HBA1C MFR BLD HPLC: 4.7 %
HCT VFR BLD CALC: 46.5 %
HDLC SERPL-MCNC: 57 MG/DL
HGB BLD-MCNC: 15.5 G/DL
IMM GRANULOCYTES NFR BLD AUTO: 0.2 %
KETONES URINE: NEGATIVE
LDLC SERPL CALC-MCNC: 111 MG/DL
LEUKOCYTE ESTERASE URINE: NEGATIVE
LYMPHOCYTES # BLD AUTO: 1.28 K/UL
LYMPHOCYTES NFR BLD AUTO: 25.1 %
MAN DIFF?: NORMAL
MCHC RBC-ENTMCNC: 29.6 PG
MCHC RBC-ENTMCNC: 33.3 GM/DL
MCV RBC AUTO: 88.9 FL
MONOCYTES # BLD AUTO: 0.35 K/UL
MONOCYTES NFR BLD AUTO: 6.9 %
NEUTROPHILS # BLD AUTO: 3.41 K/UL
NEUTROPHILS NFR BLD AUTO: 66.8 %
NITRITE URINE: NEGATIVE
NONHDLC SERPL-MCNC: 125 MG/DL
OAT IGE QN: <0.1 KUA/L
PEANUT IGE QN: <0.1 KUA/L
PH URINE: 6
PLATELET # BLD AUTO: 161 K/UL
POTASSIUM SERPL-SCNC: 4.2 MMOL/L
PROT SERPL-MCNC: 6.8 G/DL
PROTEIN URINE: NEGATIVE
PSA FREE FLD-MCNC: 33 %
PSA FREE SERPL-MCNC: 0.33 NG/ML
PSA SERPL-MCNC: 0.98 NG/ML
RBC # BLD: 5.23 M/UL
RBC # FLD: 12.7 %
RYE IGE QN: <0.1 KUA/L
SODIUM SERPL-SCNC: 140 MMOL/L
SOYBEAN IGE QN: <0.1 KUA/L
SPECIFIC GRAVITY URINE: 1.01
TESTOST FREE SERPL-MCNC: 10.1 PG/ML
TESTOST SERPL-MCNC: 611 NG/DL
TOTAL IGE SMQN RAST: 2 KU/L
TRIGL SERPL-MCNC: 70 MG/DL
TSH SERPL-ACNC: 1.89 UIU/ML
UROBILINOGEN URINE: NORMAL
WBC # FLD AUTO: 5.1 K/UL
WHEAT IGE QN: <0.1 KUA/L

## 2023-03-24 RX ORDER — PANTOPRAZOLE 40 MG/1
40 TABLET, DELAYED RELEASE ORAL
Qty: 30 | Refills: 5 | Status: ACTIVE | COMMUNITY
Start: 2023-03-24 | End: 1900-01-01

## 2023-03-28 DIAGNOSIS — R10.84 GENERALIZED ABDOMINAL PAIN: ICD-10-CM

## 2023-03-28 DIAGNOSIS — R39.89 OTHER SYMPTOMS AND SIGNS INVOLVING THE GENITOURINARY SYSTEM: ICD-10-CM

## 2023-03-29 ENCOUNTER — APPOINTMENT (OUTPATIENT)
Dept: UROLOGY | Facility: CLINIC | Age: 43
End: 2023-03-29

## 2023-04-01 PROBLEM — E29.1 HYPOGONADISM IN MALE: Status: ACTIVE | Noted: 2023-03-21

## 2023-04-01 PROBLEM — K58.9 IRRITABLE BOWEL SYNDROME, UNSPECIFIED TYPE: Status: ACTIVE | Noted: 2023-03-13

## 2023-04-01 PROBLEM — R79.89 LOW VITAMIN D LEVEL: Status: ACTIVE | Noted: 2023-04-01

## 2023-04-01 PROBLEM — Z13.6 SCREENING FOR HEART DISEASE: Status: ACTIVE | Noted: 2023-03-21

## 2023-04-01 NOTE — END OF VISIT
[FreeTextEntry4] : I Cherrie Nagel am scribing for and in the presence of Dr. Ignacio Ureña, the following sections: HISTORY OF PRESENT ILLNESS, PAST MEDICAL/FAMILY/SOCIAL HISTORY, REVIEW OF SYSTEMS, PHYSICAL EXAM; DISPOSITION.\par \par I personally performed the services described in the documentation, reviewed the documentation recorded by the scribe in my presence and it accurately and completely records my words and actions.

## 2023-04-01 NOTE — HEALTH RISK ASSESSMENT
[No] : No [0] : 2) Feeling down, depressed, or hopeless: Not at all (0) [PHQ-2 Negative - No further assessment needed] : PHQ-2 Negative - No further assessment needed [Never] : Never [[id number]] : 0

## 2023-04-01 NOTE — HISTORY OF PRESENT ILLNESS
[FreeTextEntry1] : annual wellness  [de-identified] : 41 y/o male presents for annual wellness exam. He is currently under the care of a gastroenterologist for IBS and is requesting blood work for food allergies. He is also requesting to check testosterone levels. He feels otherwise well and reports no other acute complaints or concerns. ROS as documented below.\par

## 2023-04-01 NOTE — REVIEW OF SYSTEMS
[Abdominal Pain] : abdominal pain [Constipation] : constipation [Diarrhea] : diarrhea [Fever] : no fever [Chills] : no chills [Recent Change In Weight] : ~T no recent weight change [Vision Problems] : no vision problems [Earache] : no earache [Nasal Discharge] : no nasal discharge [Sore Throat] : no sore throat [Chest Pain] : no chest pain [Palpitations] : no palpitations [Shortness Of Breath] : no shortness of breath [Nausea] : no nausea [Wheezing] : no wheezing [Vomiting] : no vomiting [Dysuria] : no dysuria [Hesitancy] : no hesitancy [Hematuria] : no hematuria [Frequency] : no frequency [Joint Pain] : no joint pain [Joint Swelling] : no joint swelling [Skin Rash] : no skin rash [Headache] : no headache [Dizziness] : no dizziness [Anxiety] : no anxiety [Depression] : no depression [Swollen Glands] : no swollen glands

## 2024-03-03 NOTE — DISCHARGE NOTE PROVIDER - HOSPITAL COURSE
Patient is a 41yo man with PMH spinal cord injury (no residual motor deficits) presenting to [facility] ED for 21 hours of acute-onset LLQ pain. He states that he went to bed last night in his normal state of health but woke up at 3am on 9/29 with LLQ pain. He urinated then went back to sleep and woke up at 6am experiencing worsening abdominal pain that he describes as non-radiating, constant, and rated 7-8/10 in severity. It is worsened by eating and is accompanied by intermittent nausea but no vomiting. He took no pain medications at home, but took GasX with no relief. He then went to urgent care where he had a negative UA and was sent to the ED. He reports no f/c, sob, vomiting or change in bowel habits. He reports he had a normal, nonbloody stool this morning with no discomfort. His last colonoscopy was in 2014 and showed multiple polyps.    Surgery consulted to evaluate diverticulitis. Patient seen and examined in ED. Patient is hemodynamically stable and comfortable in bed. Reports pain in LLQ. Abdomen is soft, tender in LLQ, no guarding or bounding. CT demonstrated an acute diverticulitis with microperforation. Labs are WNL.  The patient was admitted to surgical service and was monitored for any acute changes. Throughout his hospitalization he was monitored for any electrolyte abnormalities, where were repleted when appropriate, or vital derangement's. The patient improved on IV abx, fluids, and was discharged with 2 weeks of Augmentin and instructed to follow-up with his PCP and Dr. Rabago.  Pt was instructed to take Tylenol and Motrin for pain control. Scott

## 2024-05-30 NOTE — ED ADULT TRIAGE NOTE - BANDS:
Problem: At Risk for Falls  Goal: Patient does not fall  Outcome: Monitoring/Evaluating progress  Goal: Patient takes action to control fall-related risks  Outcome: Monitoring/Evaluating progress     Problem: Pain  Goal: Acceptable pain level achieved/maintained at rest using appropriate pain scale for the patient  Outcome: Monitoring/Evaluating progress      Allergy;

## 2025-02-07 ENCOUNTER — NON-APPOINTMENT (OUTPATIENT)
Age: 45
End: 2025-02-07

## 2025-09-05 ENCOUNTER — NON-APPOINTMENT (OUTPATIENT)
Age: 45
End: 2025-09-05

## 2025-09-05 ENCOUNTER — APPOINTMENT (OUTPATIENT)
Dept: CARDIOLOGY | Facility: CLINIC | Age: 45
End: 2025-09-05
Payer: COMMERCIAL

## 2025-09-05 VITALS
BODY MASS INDEX: 22.48 KG/M2 | OXYGEN SATURATION: 99 % | HEIGHT: 70 IN | RESPIRATION RATE: 19 BRPM | HEART RATE: 55 BPM | SYSTOLIC BLOOD PRESSURE: 98 MMHG | WEIGHT: 157 LBS | DIASTOLIC BLOOD PRESSURE: 70 MMHG

## 2025-09-05 DIAGNOSIS — I49.8 OTHER SPECIFIED CARDIAC ARRHYTHMIAS: ICD-10-CM

## 2025-09-05 DIAGNOSIS — Z82.49 FAMILY HISTORY OF ISCHEMIC HEART DISEASE AND OTHER DISEASES OF THE CIRCULATORY SYSTEM: ICD-10-CM

## 2025-09-05 DIAGNOSIS — R07.82 INTERCOSTAL PAIN: ICD-10-CM

## 2025-09-05 PROCEDURE — 93000 ELECTROCARDIOGRAM COMPLETE: CPT

## 2025-09-05 PROCEDURE — 99203 OFFICE O/P NEW LOW 30 MIN: CPT | Mod: 25

## (undated) DEVICE — CATH IV SAFE BC 20G X 1.16" (PINK)

## (undated) DEVICE — TUBING IV SET GRAVITY 3Y 100" MACRO

## (undated) DEVICE — CLAMP BX HOT RAD JAW 3

## (undated) DEVICE — IRRIGATOR BIO SHIELD

## (undated) DEVICE — FOLEY HOLDER STATLOCK 2 WAY ADULT

## (undated) DEVICE — SENSOR O2 FINGER ADULT

## (undated) DEVICE — TUBING CAP SET ENDO 24HR USE GI

## (undated) DEVICE — SUCTION YANKAUER NO CONTROL VENT

## (undated) DEVICE — TUBING SUCTION CONN 6FT STERILE

## (undated) DEVICE — POLY TRAP ETRAP

## (undated) DEVICE — TUBING SUCTION 20FT

## (undated) DEVICE — ELCTR GROUNDING PAD ADULT COVIDIEN

## (undated) DEVICE — CATH IV SAFE BC 22G X 1" (BLUE)

## (undated) DEVICE — Device

## (undated) DEVICE — PACK IV START WITH CHG

## (undated) DEVICE — SOL INJ NS 0.9% 500ML 2 PORT